# Patient Record
Sex: MALE | URBAN - METROPOLITAN AREA
[De-identification: names, ages, dates, MRNs, and addresses within clinical notes are randomized per-mention and may not be internally consistent; named-entity substitution may affect disease eponyms.]

---

## 2022-03-08 ENCOUNTER — OFFICE VISIT (OUTPATIENT)
Dept: URBAN - METROPOLITAN AREA CLINIC 21 | Facility: CLINIC | Age: 77
End: 2022-03-08
Payer: MEDICARE

## 2022-03-08 DIAGNOSIS — H52.223 REGULAR ASTIGMATISM, BILATERAL: ICD-10-CM

## 2022-03-08 PROCEDURE — 92025 CPTRIZED CORNEAL TOPOGRAPHY: CPT | Performed by: OPHTHALMOLOGY

## 2022-03-08 PROCEDURE — 92136 OPHTHALMIC BIOMETRY: CPT | Performed by: OPHTHALMOLOGY

## 2022-03-08 PROCEDURE — 99204 OFFICE O/P NEW MOD 45 MIN: CPT | Performed by: OPHTHALMOLOGY

## 2022-03-08 PROCEDURE — 92134 CPTRZ OPH DX IMG PST SGM RTA: CPT | Performed by: OPHTHALMOLOGY

## 2022-03-08 RX ORDER — PREDNISOLONE ACETATE 10 MG/ML
1 % SUSPENSION/ DROPS OPHTHALMIC
Qty: 10 | Refills: 1 | Status: ACTIVE
Start: 2022-03-08

## 2022-03-08 RX ORDER — ATROPINE SULFATE 10 MG/ML
1 % SOLUTION/ DROPS OPHTHALMIC
Qty: 2 | Refills: 0 | Status: ACTIVE
Start: 2022-03-08

## 2022-03-08 RX ORDER — OFLOXACIN 3 MG/ML
0.3 % SOLUTION/ DROPS OPHTHALMIC
Qty: 5 | Refills: 1 | Status: ACTIVE
Start: 2022-03-08

## 2022-03-08 ASSESSMENT — VISUAL ACUITY
OD: 20/60
OS: 20/50

## 2022-03-08 ASSESSMENT — INTRAOCULAR PRESSURE
OD: 22
OS: 22

## 2022-03-08 NOTE — IMPRESSION/PLAN
Impression: Combined forms of age-related cataract, bilateral: H25.813. Plan: Advised patient of condition. Risks, benefits, and alternatives of cataract surgery discussed. Patient elects to proceed with surgery OD  first, OS to follow. Patient candidate for Panoptix, Lensx and ORA, OU Patient will need Atropine BID OU prior to surgery. E-rx PF 1% and Ocuflox. 
Patient elects all Std for now for target of distance, OU

## 2022-03-08 NOTE — IMPRESSION/PLAN
Impression: Regular astigmatism, bilateral: H52.223.  Plan: Pt considering Lensx and ORA to help reduce astigmatism, OU

## 2022-03-23 ENCOUNTER — PROCEDURE (OUTPATIENT)
Dept: URBAN - METROPOLITAN AREA SURGERY 8 | Facility: SURGERY | Age: 77
End: 2022-03-23
Payer: MEDICARE

## 2022-03-23 DIAGNOSIS — H25.813 COMBINED FORMS OF AGE-RELATED CATARACT, BILATERAL: Primary | ICD-10-CM

## 2022-03-23 PROCEDURE — 66984 XCAPSL CTRC RMVL W/O ECP: CPT | Performed by: OPHTHALMOLOGY

## 2022-03-24 ENCOUNTER — POST-OPERATIVE VISIT (OUTPATIENT)
Dept: URBAN - METROPOLITAN AREA CLINIC 21 | Facility: CLINIC | Age: 77
End: 2022-03-24

## 2022-03-24 PROCEDURE — 99024 POSTOP FOLLOW-UP VISIT: CPT | Performed by: OPTOMETRIST

## 2022-03-24 ASSESSMENT — INTRAOCULAR PRESSURE: OD: 27

## 2022-03-24 NOTE — IMPRESSION/PLAN
Impression: S/P Cataract Extraction by phacoemulsification with IOL placement OD - 1 Day. Encounter for surgical aftercare following surgery on a sense organ  Z48.810. Plan: No infection or complications. Patient doing well, will continue drops as scheduled. Patient will return in 1 week for post-op exam.  If any changes return sooner. Sample combigan , in office, T1/2, Tid, OD. --Continue Prednisolone acetate 1% Q2h 
--Ofloxacin QID OD 
--Ketorolac BID OD Disp 4 vials of cosopt pt to use TID OD. 
1 gtt of combigan today at p/o

## 2022-03-29 ENCOUNTER — POST-OPERATIVE VISIT (OUTPATIENT)
Dept: URBAN - METROPOLITAN AREA CLINIC 21 | Facility: CLINIC | Age: 77
End: 2022-03-29
Payer: MEDICARE

## 2022-03-29 DIAGNOSIS — H25.812 COMBINED FORMS OF AGE-RELATED CATARACT, LEFT EYE: Primary | ICD-10-CM

## 2022-03-29 DIAGNOSIS — H52.4 PRESBYOPIA: ICD-10-CM

## 2022-03-29 DIAGNOSIS — Z48.810 ENCOUNTER FOR SURGICAL AFTERCARE FOLLOWING SURGERY ON A SENSE ORGAN: ICD-10-CM

## 2022-03-29 PROCEDURE — 92015 DETERMINE REFRACTIVE STATE: CPT | Performed by: OPHTHALMOLOGY

## 2022-03-29 PROCEDURE — 99024 POSTOP FOLLOW-UP VISIT: CPT | Performed by: OPHTHALMOLOGY

## 2022-03-29 ASSESSMENT — VISUAL ACUITY: OD: 20/30

## 2022-03-29 ASSESSMENT — INTRAOCULAR PRESSURE: OD: 16

## 2022-03-29 NOTE — IMPRESSION/PLAN
Impression: Combined forms of age-related cataract, left eye: H25.812. Plan: Advised patient of condition. Risks, benefits, and alternatives of cataract surgery discussed. Patient elects to proceed with surgery OS. Pt elects all Std for distance.  Pt has PF 1% and Ocuflox

## 2022-04-06 ENCOUNTER — PROCEDURE (OUTPATIENT)
Dept: URBAN - METROPOLITAN AREA SURGERY 8 | Facility: SURGERY | Age: 77
End: 2022-04-06
Payer: MEDICARE

## 2022-04-06 PROCEDURE — 66984 XCAPSL CTRC RMVL W/O ECP: CPT | Performed by: OPHTHALMOLOGY

## 2022-04-07 ENCOUNTER — POST-OPERATIVE VISIT (OUTPATIENT)
Dept: URBAN - METROPOLITAN AREA CLINIC 21 | Facility: CLINIC | Age: 77
End: 2022-04-07
Payer: MEDICARE

## 2022-04-07 DIAGNOSIS — Z96.1 PRESENCE OF INTRAOCULAR LENS: Primary | ICD-10-CM

## 2022-04-07 PROCEDURE — 99024 POSTOP FOLLOW-UP VISIT: CPT | Performed by: OPTOMETRIST

## 2022-04-07 ASSESSMENT — INTRAOCULAR PRESSURE
OS: 29
OS: 36
OS: 24
OS: 24
OS: 36
OS: 29
OS: 38
OS: 38

## 2022-04-07 NOTE — IMPRESSION/PLAN
Impression: S/P Cataract Extraction by phacoemulsification with IOL placement OS - 1 Day. Encounter for surgical aftercare following surgery on a sense organ  Z48.810. Plan: Continue PF 1%, Ocuflox as instructed. Sample of combigan given pt to use bid OS till next visit.

## 2022-04-07 NOTE — IMPRESSION/PLAN
Impression: S/P Cataract Extraction by phacoemulsification with IOL placement OS - 1 Day. Presence of intraocular lens  Z96.1. Excellent post op course   Post operative instructions reviewed - Plan: No infection or complications. Patient doing well, will continue drops as scheduled. Patient will return in 1 week for post-op exam.  If any changes return sooner. 1 drop combigan, now, OS.  1 capsule of  500 sequel diamox given to patient with a water bottle, pt instructed to drink with the entire water bottle. Pt to return after lunch for IOP check.

## 2022-04-14 ENCOUNTER — POST-OPERATIVE VISIT (OUTPATIENT)
Dept: URBAN - METROPOLITAN AREA CLINIC 21 | Facility: CLINIC | Age: 77
End: 2022-04-14
Payer: MEDICARE

## 2022-04-14 DIAGNOSIS — Z96.1 PRESENCE OF INTRAOCULAR LENS: Primary | ICD-10-CM

## 2022-04-14 DIAGNOSIS — H52.4 PRESBYOPIA: ICD-10-CM

## 2022-04-14 PROCEDURE — 99024 POSTOP FOLLOW-UP VISIT: CPT | Performed by: OPTOMETRIST

## 2022-04-14 NOTE — IMPRESSION/PLAN
Impression: S/P Cataract Extraction by phacoemulsification with IOL placement OS - 8 Days. Presence of intraocular lens  Z96.1. Excellent post op course   Post operative instructions reviewed - Plan: Patient healing well, continue post-op drops as directed. Call or return sooner if any changes.   Return in 3 weeks for final post-op exam.